# Patient Record
Sex: MALE | Race: WHITE | Employment: PART TIME | URBAN - METROPOLITAN AREA
[De-identification: names, ages, dates, MRNs, and addresses within clinical notes are randomized per-mention and may not be internally consistent; named-entity substitution may affect disease eponyms.]

---

## 2021-04-27 ENCOUNTER — OFFICE VISIT (OUTPATIENT)
Dept: AUDIOLOGY | Facility: CLINIC | Age: 66
End: 2021-04-27
Payer: COMMERCIAL

## 2021-04-27 ENCOUNTER — OFFICE VISIT (OUTPATIENT)
Dept: OTOLARYNGOLOGY | Facility: CLINIC | Age: 66
End: 2021-04-27
Payer: COMMERCIAL

## 2021-04-27 VITALS — OXYGEN SATURATION: 96 % | DIASTOLIC BLOOD PRESSURE: 84 MMHG | HEART RATE: 66 BPM | SYSTOLIC BLOOD PRESSURE: 141 MMHG

## 2021-04-27 DIAGNOSIS — H90.A31 MIXED CONDUCTIVE AND SENSORINEURAL HEARING LOSS OF RIGHT EAR WITH RESTRICTED HEARING OF LEFT EAR: Primary | ICD-10-CM

## 2021-04-27 DIAGNOSIS — H90.3 SENSORINEURAL HEARING LOSS (SNHL) OF BOTH EARS: Primary | ICD-10-CM

## 2021-04-27 PROCEDURE — 92567 TYMPANOMETRY: CPT | Mod: 52 | Performed by: AUDIOLOGIST

## 2021-04-27 PROCEDURE — 69210 REMOVE IMPACTED EAR WAX UNI: CPT | Performed by: OTOLARYNGOLOGY

## 2021-04-27 PROCEDURE — 99203 OFFICE O/P NEW LOW 30 MIN: CPT | Mod: 25 | Performed by: OTOLARYNGOLOGY

## 2021-04-27 PROCEDURE — 99207 PR NO CHARGE LOS: CPT | Performed by: AUDIOLOGIST

## 2021-04-27 PROCEDURE — 92557 COMPREHENSIVE HEARING TEST: CPT | Performed by: AUDIOLOGIST

## 2021-04-27 RX ORDER — ROSUVASTATIN CALCIUM 5 MG/1
5 TABLET, COATED ORAL
COMMUNITY
Start: 2020-07-13

## 2021-04-27 RX ORDER — METOPROLOL TARTRATE 50 MG
25 TABLET ORAL
COMMUNITY
Start: 2020-07-13

## 2021-04-27 RX ORDER — LEVOTHYROXINE SODIUM 50 MCG
TABLET ORAL
COMMUNITY
Start: 2021-02-19

## 2021-04-27 RX ORDER — ASPIRIN 81 MG/1
81 TABLET ORAL DAILY
COMMUNITY

## 2021-04-27 SDOH — HEALTH STABILITY: MENTAL HEALTH: HOW OFTEN DO YOU HAVE 6 OR MORE DRINKS ON ONE OCCASION?: NOT ASKED

## 2021-04-27 SDOH — HEALTH STABILITY: MENTAL HEALTH: HOW MANY STANDARD DRINKS CONTAINING ALCOHOL DO YOU HAVE ON A TYPICAL DAY?: NOT ASKED

## 2021-04-27 SDOH — HEALTH STABILITY: MENTAL HEALTH: HOW OFTEN DO YOU HAVE A DRINK CONTAINING ALCOHOL?: NOT ASKED

## 2021-04-27 ASSESSMENT — ENCOUNTER SYMPTOMS
SPUTUM PRODUCTION: 0
PHOTOPHOBIA: 0
CONSTITUTIONAL NEGATIVE: 1
COUGH: 0
HEADACHES: 0
BLURRED VISION: 0
DOUBLE VISION: 0
NAUSEA: 0
HEMOPTYSIS: 0
STRIDOR: 0
TREMORS: 0
VOMITING: 0
BRUISES/BLEEDS EASILY: 0
HEARTBURN: 0
TINGLING: 0
DIZZINESS: 0
SORE THROAT: 0
SINUS PAIN: 0

## 2021-04-27 NOTE — PROGRESS NOTES
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 Reports longstanding tinnitus bilaterally.  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 WNL sloping to moderate high freq. SNHL left. 100% word rec. bilaterally. Left tymp WNL.  DNT right due to somewhat unclear surgical Hx.  Right open cavity mastoidectomy.  No known allergies.    Medications     aspirin 81 MG EC tablet  metoprolol tartrate (LOPRESSOR) 50 MG tablet  rosuvastatin (CRESTOR) 5 MG tablet  omeprazole (PRILOSEC) 20 MG DR capsule  SYNTHROID 50 MCG tablet         Review of Systems   Constitutional: Negative.    HENT: Positive for hearing loss and tinnitus. Negative for congestion, ear discharge, ear pain, nosebleeds, sinus pain and sore throat.    Eyes: Negative for blurred vision, double vision and photophobia.   Respiratory: Negative for cough, hemoptysis, sputum production and stridor.    Gastrointestinal: Negative for heartburn, nausea and vomiting.   Skin: Negative.    Neurological: Negative for dizziness, tingling, tremors and headaches.   Endo/Heme/Allergies: Negative for environmental allergies. Does not bruise/bleed easily.         Physical Exam  Vitals signs reviewed.   Constitutional:       Appearance: Normal appearance.   HENT:      Head: Normocephalic and atraumatic.      Jaw: There is normal jaw occlusion.      Right Ear: Tympanic membrane, ear canal and external ear normal. Decreased hearing noted. No middle ear effusion. There is impacted cerumen.      Left Ear: Tympanic membrane, ear canal and external ear normal. Decreased hearing noted.  No middle ear effusion. There is impacted cerumen.      Nose: Nose normal. No septal deviation, laceration or mucosal edema.      Right Turbinates: Not enlarged or swollen.      Left Turbinates: Not enlarged or swollen.      Mouth/Throat:      Mouth: Mucous membranes are moist.      Pharynx: Oropharynx is clear. Uvula midline.   Eyes:      Pupils:  Pupils are equal, round, and reactive to light.   Neurological:      Mental Status: He is alert.       Right open cavity was seen under the microscope. Ear wax was removed with an alligaor, curette and suctioning with a 7 tip. Mucosal lining is good. Left ear canal was blocked with ear wax that was cleaned under the microscope with an alligator.     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 Reports longstanding tinnitus bilaterally.  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 WNL sloping to moderate high freq. SNHL left. 100% word rec. bilaterally. Left tymp WNL. Options including hearing aids for his both ears were discussed.

## 2021-04-27 NOTE — LETTER
4/27/2021         RE: Alireza Barroso  [id number] [address] [place] [id number]        Dear Colleague,    Thank you for referring your patient, Alireza Barroso, to the [facility]. Please see a copy of my visit note below.    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 Reports longstanding tinnitus bilaterally.  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 WNL sloping to moderate high freq. SNHL left. 100% word rec. bilaterally. Left tymp WNL.  DNT right due to somewhat unclear surgical Hx.  Right open cavity mastoidectomy.  No known allergies.    Medications     aspirin 81 MG EC tablet  metoprolol tartrate (LOPRESSOR) 50 MG tablet  rosuvastatin (CRESTOR) 5 MG tablet  omeprazole (PRILOSEC) 20 MG DR capsule  SYNTHROID 50 MCG tablet         Review of Systems   Constitutional: Negative.    HENT: Positive for hearing loss and tinnitus. Negative for congestion, ear discharge, ear pain, nosebleeds, sinus pain and sore throat.    Eyes: Negative for blurred vision, double vision and photophobia.   Respiratory: Negative for cough, hemoptysis, sputum production and stridor.    Gastrointestinal: Negative for heartburn, nausea and vomiting.   Skin: Negative.    Neurological: Negative for dizziness, tingling, tremors and headaches.   Endo/Heme/Allergies: Negative for environmental allergies. Does not bruise/bleed easily.         Physical Exam  Vitals signs reviewed.   Constitutional:       Appearance: Normal appearance.   HENT:      Head: Normocephalic and atraumatic.      Jaw: There is normal jaw occlusion.      Right Ear: Tympanic membrane, ear canal and external ear normal. Decreased hearing noted. No middle ear effusion. There is impacted cerumen.      Left Ear: Tympanic membrane, ear canal and external ear normal. Decreased hearing noted.  No middle ear effusion. There is impacted cerumen.      Nose: Nose normal. No septal deviation,  laceration or mucosal edema.      Right Turbinates: Not enlarged or swollen.      Left Turbinates: Not enlarged or swollen.      Mouth/Throat:      Mouth: Mucous membranes are moist.      Pharynx: Oropharynx is clear. Uvula midline.   Eyes:      Pupils: Pupils are equal, round, and reactive to light.   Neurological:      Mental Status: He is alert.       Right open cavity was seen under the microscope. Ear wax was removed with an alligaor, curette and suctioning with a 7 tip. Mucosal lining is good. Left ear canal was blocked with ear wax that was cleaned under the microscope with an alligator.     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 Reports longstanding tinnitus bilaterally.  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 WNL sloping to moderate high freq. SNHL left. 100% word rec. bilaterally. Left tymp WNL. Options including hearing aids for his both ears were discussed.        Again, thank you for allowing me to participate in the care of your patient.        Sincerely,        Mario Mario MD

## 2021-04-27 NOTE — PROGRESS NOTES
AUDIOLOGY REPORT    SUMMARY: Audiology visit completed. See audiogram for results.    RECOMMENDATIONS: Follow-up with ENT.    Felix Ortiz  Doctor of Audiology  [place] License # [id number]

## 2021-04-27 NOTE — NURSING NOTE
Alireza Barroso's goals for this visit include:   Chief Complaint   Patient presents with     Consult     Paplisaella patient, hx collaspt ear drum several years ago. Needs ear cleaning yearly right ear.        He requests these members of his care team be copied on today's visit information: Keri    PCP: No Ref-Primary, Physician    Referring Provider:  No referring provider defined for this encounter.    BP (!) 141/84   Pulse 66   SpO2 96%     Do you need any medication refills at today's visit? none